# Patient Record
Sex: FEMALE | URBAN - METROPOLITAN AREA
[De-identification: names, ages, dates, MRNs, and addresses within clinical notes are randomized per-mention and may not be internally consistent; named-entity substitution may affect disease eponyms.]

---

## 2022-11-08 ENCOUNTER — LAB OUTSIDE AN ENCOUNTER (OUTPATIENT)
Dept: URBAN - METROPOLITAN AREA CLINIC 7 | Facility: CLINIC | Age: 47
End: 2022-11-08

## 2022-11-08 ENCOUNTER — DASHBOARD ENCOUNTERS (OUTPATIENT)
Age: 47
End: 2022-11-08

## 2022-11-08 ENCOUNTER — OFFICE VISIT (OUTPATIENT)
Dept: URBAN - METROPOLITAN AREA CLINIC 7 | Facility: CLINIC | Age: 47
End: 2022-11-08
Payer: COMMERCIAL

## 2022-11-08 VITALS
HEIGHT: 62 IN | BODY MASS INDEX: 16.93 KG/M2 | TEMPERATURE: 97.7 F | WEIGHT: 92 LBS | DIASTOLIC BLOOD PRESSURE: 60 MMHG | SYSTOLIC BLOOD PRESSURE: 90 MMHG

## 2022-11-08 DIAGNOSIS — R19.7 ACUTE DIARRHEA: ICD-10-CM

## 2022-11-08 DIAGNOSIS — R10.84 GENERALIZED ABDOMINAL PAIN: ICD-10-CM

## 2022-11-08 DIAGNOSIS — R11.2 NAUSEA AND VOMITING, UNSPECIFIED VOMITING TYPE: ICD-10-CM

## 2022-11-08 PROBLEM — 102614006: Status: ACTIVE | Noted: 2022-11-08

## 2022-11-08 PROBLEM — 409966000: Status: ACTIVE | Noted: 2022-11-08

## 2022-11-08 PROBLEM — 16932000: Status: ACTIVE | Noted: 2022-11-08

## 2022-11-08 PROCEDURE — 99204 OFFICE O/P NEW MOD 45 MIN: CPT | Performed by: INTERNAL MEDICINE

## 2022-11-08 RX ORDER — PANTOPRAZOLE SODIUM 40 MG/1
1 TABLET TABLET, DELAYED RELEASE ORAL TWICE A DAY
Qty: 28 TABLET | Refills: 0 | OUTPATIENT
Start: 2022-11-08

## 2022-11-08 RX ORDER — SUCRALFATE 1 G/1
1 TABLET ON AN EMPTY STOMACH TABLET ORAL TWICE A DAY
Qty: 60 | OUTPATIENT

## 2022-11-08 NOTE — HPI-TODAY'S VISIT:
48yo F with no pmh presents with 2 weeks of severe generalized abd pain constatnt 10/10 at times all quadrants, worse with meals, has lost 10lb from avoiding food.  then started to develop diarrhea and n/v about one week ago she has 5 watery bm per day no night time awakening and as well n/v up to 4 times per day suddenly.  no fever no chills.  she has been taking pepto which has not helped.  what does help is laying down in fetal position.   no sick contacts no one else with similar illness.  social etoh use but has not drank in 2 weeks.  no nsaid use has tried tylenol which has not helped   plan: start PPI and carafate CT abd EGD  ddx includes diverticulitits/colitis and PUD

## 2022-11-10 ENCOUNTER — CLAIMS CREATED FROM THE CLAIM WINDOW (OUTPATIENT)
Dept: URBAN - METROPOLITAN AREA CLINIC 8 | Facility: CLINIC | Age: 47
End: 2022-11-10
Payer: COMMERCIAL

## 2022-11-10 ENCOUNTER — CLAIMS CREATED FROM THE CLAIM WINDOW (OUTPATIENT)
Dept: URBAN - METROPOLITAN AREA SURGERY CENTER 5 | Facility: SURGERY CENTER | Age: 47
End: 2022-11-10
Payer: COMMERCIAL

## 2022-11-10 DIAGNOSIS — K29.50 ANTRAL GASTRITIS: ICD-10-CM

## 2022-11-10 DIAGNOSIS — R10.13 ABDOMINAL DISCOMFORT, EPIGASTRIC: ICD-10-CM

## 2022-11-10 PROCEDURE — 43239 EGD BIOPSY SINGLE/MULTIPLE: CPT | Performed by: INTERNAL MEDICINE

## 2022-11-10 PROCEDURE — 88312 SPECIAL STAINS GROUP 1: CPT | Performed by: INTERNAL MEDICINE

## 2022-11-10 RX ORDER — SUCRALFATE 1 G/1
1 TABLET ON AN EMPTY STOMACH TABLET ORAL TWICE A DAY
Qty: 60 | Status: ACTIVE | COMMUNITY

## 2022-11-10 RX ORDER — PANTOPRAZOLE SODIUM 40 MG/1
1 TABLET TABLET, DELAYED RELEASE ORAL TWICE A DAY
Qty: 28 TABLET | Refills: 0 | Status: ACTIVE | COMMUNITY
Start: 2022-11-08

## 2022-12-06 ENCOUNTER — ERX REFILL RESPONSE (OUTPATIENT)
Dept: URBAN - METROPOLITAN AREA CLINIC 7 | Facility: CLINIC | Age: 47
End: 2022-12-06

## 2022-12-06 RX ORDER — SUCRALFATE 1 G/1
TAKE 1 TABLET BY MOUTH TWICE A DAY ON EMPTY STOMACH FOR 30 DAYS TABLET ORAL
Qty: 60 TABLET | Refills: 0 | OUTPATIENT

## 2022-12-06 RX ORDER — SUCRALFATE 1 G/1
1 TABLET ON AN EMPTY STOMACH TABLET ORAL TWICE A DAY
Qty: 60 | OUTPATIENT

## 2023-01-19 ENCOUNTER — ERX REFILL RESPONSE (OUTPATIENT)
Dept: URBAN - METROPOLITAN AREA CLINIC 7 | Facility: CLINIC | Age: 48
End: 2023-01-19

## 2023-01-19 RX ORDER — SUCRALFATE 1 G/1
TAKE 1 TABLET BY MOUTH TWICE A DAY ON EMPTY STOMACH TABLET ORAL
Qty: 60 TABLET | Refills: 0 | OUTPATIENT

## 2023-01-19 RX ORDER — SUCRALFATE 1 G/1
TAKE 1 TABLET BY MOUTH TWICE A DAY ON EMPTY STOMACH FOR 30 DAYS TABLET ORAL
Qty: 60 TABLET | Refills: 0 | OUTPATIENT

## 2023-10-13 ENCOUNTER — TELEPHONE ENCOUNTER (OUTPATIENT)
Dept: URBAN - METROPOLITAN AREA CLINIC 7 | Facility: CLINIC | Age: 48
End: 2023-10-13

## 2024-08-23 ENCOUNTER — TELEPHONE ENCOUNTER (OUTPATIENT)
Dept: URBAN - METROPOLITAN AREA CLINIC 7 | Facility: CLINIC | Age: 49
End: 2024-08-23

## 2024-11-06 ENCOUNTER — OFFICE VISIT (OUTPATIENT)
Dept: URBAN - METROPOLITAN AREA CLINIC 7 | Facility: CLINIC | Age: 49
End: 2024-11-06
Payer: COMMERCIAL

## 2024-11-06 VITALS
TEMPERATURE: 97.6 F | SYSTOLIC BLOOD PRESSURE: 95 MMHG | HEIGHT: 62 IN | WEIGHT: 89.4 LBS | BODY MASS INDEX: 16.45 KG/M2 | HEART RATE: 62 BPM | DIASTOLIC BLOOD PRESSURE: 63 MMHG

## 2024-11-06 DIAGNOSIS — K52.9 CHRONIC DIARRHEA: ICD-10-CM

## 2024-11-06 DIAGNOSIS — R11.10 RECURRENT VOMITING: ICD-10-CM

## 2024-11-06 DIAGNOSIS — R11.0 CHRONIC NAUSEA: ICD-10-CM

## 2024-11-06 DIAGNOSIS — K59.09 CHRONIC CONSTIPATION: ICD-10-CM

## 2024-11-06 PROBLEM — 422400008: Status: ACTIVE | Noted: 2024-11-06

## 2024-11-06 PROBLEM — 236071009: Status: ACTIVE | Noted: 2024-11-06

## 2024-11-06 PROBLEM — 236069009: Status: ACTIVE | Noted: 2024-11-06

## 2024-11-06 PROBLEM — 422587007: Status: ACTIVE | Noted: 2024-11-06

## 2024-11-06 PROCEDURE — 99215 OFFICE O/P EST HI 40 MIN: CPT | Performed by: INTERNAL MEDICINE

## 2024-11-06 RX ORDER — ACETAMINOPHEN 325 MG/1
1 TABLET AS NEEDED TABLET, FILM COATED ORAL
Status: ACTIVE | COMMUNITY

## 2024-11-06 RX ORDER — PANCRELIPASE 120000; 24000; 76000 [USP'U]/1; [USP'U]/1; [USP'U]/1
CAPSULE, DELAYED RELEASE PELLETS ORAL
Qty: 100 CAPSULE | Status: ACTIVE | COMMUNITY

## 2024-11-06 RX ORDER — SUCRALFATE 1 G/1
TABLET ORAL
Qty: 120 TABLET | Status: ACTIVE | COMMUNITY

## 2024-11-06 RX ORDER — IBUPROFEN 600 MG/1
1 TABLET WITH FOOD OR MILK AS NEEDED TABLET, FILM COATED ORAL THREE TIMES A DAY
Status: ACTIVE | COMMUNITY

## 2024-11-06 RX ORDER — PANTOPRAZOLE SODIUM 40 MG/1
1 TABLET TABLET, DELAYED RELEASE ORAL TWICE A DAY
Qty: 28 TABLET | Refills: 0 | Status: DISCONTINUED | COMMUNITY
Start: 2022-11-08

## 2024-11-06 RX ORDER — SUCRALFATE 1 G/1
TAKE 1 TABLET BY MOUTH TWICE A DAY ON EMPTY STOMACH TABLET ORAL
Qty: 60 TABLET | Refills: 0 | Status: DISCONTINUED | COMMUNITY

## 2024-11-06 NOTE — HPI-TODAY'S VISIT:
48yo F with no pmh presents with 2 weeks of severe generalized abd pain constatnt 10/10 at times all quadrants, worse with meals, has lost 10lb from avoiding food.  then started to develop diarrhea and n/v about one week ago she has 5 watery bm per day no night time awakening and as well n/v up to 4 times per day suddenly.  no fever no chills.  she has been taking pepto which has not helped.  what does help is laying down in fetal position.   no sick contacts no one else with similar illness.  social etoh use but has not drank in 2 weeks.  no nsaid use has tried tylenol which has not helped   plan: start PPI and carafate CT abd EGD  ddx includes diverticulitits/colitis and PUD  Interval hx 11.06.2024 49 year old female presents today for abdominal pain. Patient states over the last few months she has been in and out of the hospital for abdominal pain, nausea with vomiting and diarrhea. Patient states her bowel movements can go several days with watery diarrhea having around 3 to 4 movements daily and than can switch to constipation which can last 2 days without a bowel movement. Patient had three CT Abdomen and Pelvis studies while in and out of the hospital. No acute abnormalities, however on one of the scans it was noted Gastroenteritis. Patient states she is still experiencing the chronic diarrhea, nausea and vomiting. Patient denies any blood in stool. Discussed scheduled a Colonoscopy. Discussed completing a stool panel. Discussed in detail alcohol usage and trying to stop drinking for a period to see if this helps calm down her symptoms. Patient currently admits to drinking 2 to 3 beers daily. No previous Colonoscopy Previous Upper Endoscopy completed 11.10.2022

## 2024-12-31 ENCOUNTER — OFFICE VISIT (OUTPATIENT)
Dept: URBAN - METROPOLITAN AREA SURGERY CENTER 5 | Facility: SURGERY CENTER | Age: 49
End: 2024-12-31

## 2025-03-27 ENCOUNTER — TELEPHONE ENCOUNTER (OUTPATIENT)
Dept: URBAN - METROPOLITAN AREA CLINIC 7 | Facility: CLINIC | Age: 50
End: 2025-03-27